# Patient Record
Sex: MALE | Race: ASIAN | NOT HISPANIC OR LATINO
[De-identification: names, ages, dates, MRNs, and addresses within clinical notes are randomized per-mention and may not be internally consistent; named-entity substitution may affect disease eponyms.]

---

## 2023-03-03 ENCOUNTER — APPOINTMENT (OUTPATIENT)
Dept: UROLOGY | Facility: CLINIC | Age: 53
End: 2023-03-03
Payer: COMMERCIAL

## 2023-03-03 VITALS
HEART RATE: 64 BPM | RESPIRATION RATE: 16 BRPM | DIASTOLIC BLOOD PRESSURE: 90 MMHG | OXYGEN SATURATION: 96 % | TEMPERATURE: 97.7 F | SYSTOLIC BLOOD PRESSURE: 135 MMHG | BODY MASS INDEX: 29.59 KG/M2 | WEIGHT: 197.5 LBS | HEIGHT: 68.5 IN

## 2023-03-03 DIAGNOSIS — Z00.00 ENCOUNTER FOR GENERAL ADULT MEDICAL EXAMINATION W/OUT ABNORMAL FINDINGS: ICD-10-CM

## 2023-03-03 DIAGNOSIS — N20.0 CALCULUS OF KIDNEY: ICD-10-CM

## 2023-03-03 DIAGNOSIS — Z78.9 OTHER SPECIFIED HEALTH STATUS: ICD-10-CM

## 2023-03-03 DIAGNOSIS — R31.21 ASYMPTOMATIC MICROSCOPIC HEMATURIA: ICD-10-CM

## 2023-03-03 PROCEDURE — 99204 OFFICE O/P NEW MOD 45 MIN: CPT | Mod: 25

## 2023-03-03 PROCEDURE — 76775 US EXAM ABDO BACK WALL LIM: CPT

## 2023-03-03 PROCEDURE — 52000 CYSTOURETHROSCOPY: CPT

## 2023-03-04 LAB
ANION GAP SERPL CALC-SCNC: 15 MMOL/L
APPEARANCE: CLEAR
BACTERIA: NEGATIVE
BILIRUBIN URINE: NEGATIVE
BLOOD URINE: NEGATIVE
BUN SERPL-MCNC: 14 MG/DL
CALCIUM SERPL-MCNC: 9.5 MG/DL
CHLORIDE SERPL-SCNC: 103 MMOL/L
CO2 SERPL-SCNC: 24 MMOL/L
COLOR: NORMAL
CREAT SERPL-MCNC: 0.71 MG/DL
EGFR: 112 ML/MIN/1.73M2
GLUCOSE QUALITATIVE U: NEGATIVE
GLUCOSE SERPL-MCNC: 82 MG/DL
HYALINE CASTS: 0 /LPF
KETONES URINE: NEGATIVE
LEUKOCYTE ESTERASE URINE: NEGATIVE
MICROSCOPIC-UA: NORMAL
NITRITE URINE: NEGATIVE
PH URINE: 6.5
POTASSIUM SERPL-SCNC: 3.8 MMOL/L
PROTEIN URINE: NEGATIVE
PSA FREE FLD-MCNC: 37 %
PSA FREE SERPL-MCNC: 0.48 NG/ML
PSA SERPL-MCNC: 1.29 NG/ML
RED BLOOD CELLS URINE: 2 /HPF
SODIUM SERPL-SCNC: 142 MMOL/L
SPECIFIC GRAVITY URINE: 1.02
SQUAMOUS EPITHELIAL CELLS: 0 /HPF
UROBILINOGEN URINE: NORMAL
WHITE BLOOD CELLS URINE: 1 /HPF

## 2023-03-05 NOTE — ADDENDUM
[FreeTextEntry1] : Entered by DYANA JUAN, acting as scribe for Dr. Miah Gamble.\par The documentation recorded by the scribe accurately reflects the service I personally performed and the decisions made by me.

## 2023-03-05 NOTE — LETTER BODY
[FreeTextEntry1] : Marcelo Arnold MD\par [address],\[place] [address]\par [phone]\par \par Dear Dr. Arnold,\par \par Reason for visit: Hematuria.\par \par This is a 52 year old Mandarin speaking man with hematuria referred for evaluation. Patient is entirely asymptomatic. He denies any dysuria or urinary incontinence. The patient does not smoke.  The patient denies any aggravating or relieving factors. The patient denies any interference of function. The patient is entirely asymptomatic.  All other review of systems are negative. Past medical history, family history and social history were inquired and were noncontributory to patient condition. Medications and allergies were reviewed.\par \par On examination, the patient is in no acute distress. He is alert and oriented follows commands. He has normal mood and affect. He is normocephalic. Neck is supple. Respirations are unlabored. His abdomen is soft and nontender. Bladder is nonpalpable. No CVA tenderness. Neurologically he is grossly intact. No peripheral edema. Skin without gross abnormality. He has normal male external genitalia. Normal meatus. Bilateral testes are descended intrascrotally and normal to palpation. On rectal examination, there is normal sphincter tone. The prostate is clinically benign without focal induration or nodularity.\par \par Assessment: Hematuria.\par \par I counseled the patient on the various etiologies of the hematuria. I discussed the risk of occult malignancy. I counseled the patient about hematuria. I recommended patient obtain urine cytology, upper tract imaging, and cystoscopy.  I counseled the patient about the procedures. I answered the patient's questions. The risks and expected outcomes were discussed. I recommended he obtain PSA, BMP, and urinalysis to establish baselines. The patient will follow up as directed and contact me with any questions or concerns. Thank you for the opportunity to participate in the care of this patient. I'll keep you updated on his progress.\par \par Plan: Cystoscopy, upper tract imaging, urine cytology. PSA. BMP. Urinalysis. Follow up as directed. \par \par His ultrasound today demonstrated a 7 mm echogenic focus with shadow and twinkle in the left kidney upper pole. Both kidneys appear normal in size and echogenicity. No solid masses or hydronephrosis visualized.\par \par His hematuria evaluation today was negative.\par \par I counseled the patient. His ultrasound today demonstrated a 7 mm left kidney stone. I recommended he obtain a CT scan for further evaluation. I counseled the patient regarding the procedure. The risks and benefits were discussed. Alternatives were given. I answered the patient questions. The patient will take the necessary preparations for the procedure. The patient will follow-up as directed and will contact me with any questions or concerns. Thank you for the opportunity to participate in the care of this patient, I will keep you updated on his progress.\par \par Plan: Abdomen CT scan. PSA. BMP. Urinalysis. Urine cytology. Follow up as directed.

## 2023-03-06 LAB — URINE CYTOLOGY: NORMAL

## 2023-03-28 ENCOUNTER — NON-APPOINTMENT (OUTPATIENT)
Age: 53
End: 2023-03-28

## 2023-09-08 ENCOUNTER — APPOINTMENT (OUTPATIENT)
Dept: UROLOGY | Facility: CLINIC | Age: 53
End: 2023-09-08